# Patient Record
Sex: FEMALE | Race: WHITE | NOT HISPANIC OR LATINO | Employment: UNEMPLOYED | URBAN - METROPOLITAN AREA
[De-identification: names, ages, dates, MRNs, and addresses within clinical notes are randomized per-mention and may not be internally consistent; named-entity substitution may affect disease eponyms.]

---

## 2021-11-13 ENCOUNTER — APPOINTMENT (OUTPATIENT)
Dept: CT IMAGING | Facility: HOSPITAL | Age: 51
End: 2021-11-13

## 2021-11-13 ENCOUNTER — HOSPITAL ENCOUNTER (EMERGENCY)
Facility: HOSPITAL | Age: 51
Discharge: HOME | End: 2021-11-13
Attending: EMERGENCY MEDICINE | Admitting: EMERGENCY MEDICINE

## 2021-11-13 VITALS
BODY MASS INDEX: 30.13 KG/M2 | HEART RATE: 61 BPM | SYSTOLIC BLOOD PRESSURE: 98 MMHG | OXYGEN SATURATION: 98 % | WEIGHT: 149.47 LBS | RESPIRATION RATE: 16 BRPM | HEIGHT: 59 IN | DIASTOLIC BLOOD PRESSURE: 60 MMHG | TEMPERATURE: 97.7 F

## 2021-11-13 DIAGNOSIS — R10.9 FLANK PAIN: ICD-10-CM

## 2021-11-13 DIAGNOSIS — N12 PYELONEPHRITIS: ICD-10-CM

## 2021-11-13 LAB
ALBUMIN SERPL-MCNC: 4 G/DL (ref 3.5–5.2)
ALBUMIN/GLOB SERPL: 1.3 G/DL
ALP SERPL-CCNC: 115 U/L (ref 39–117)
ALT SERPL W P-5'-P-CCNC: 16 U/L (ref 1–33)
ANION GAP SERPL CALCULATED.3IONS-SCNC: 8.6 MMOL/L (ref 5–15)
AST SERPL-CCNC: 13 U/L (ref 1–32)
BACTERIA UR QL AUTO: ABNORMAL /HPF
BASOPHILS # BLD AUTO: 0.01 10*3/MM3 (ref 0–0.2)
BASOPHILS NFR BLD AUTO: 0.2 % (ref 0–1.5)
BILIRUB SERPL-MCNC: 0.4 MG/DL (ref 0–1.2)
BILIRUB UR QL STRIP: NEGATIVE
BUN SERPL-MCNC: 17 MG/DL (ref 6–20)
BUN/CREAT SERPL: 17.9 (ref 7–25)
CALCIUM SPEC-SCNC: 9 MG/DL (ref 8.6–10.5)
CHLORIDE SERPL-SCNC: 102 MMOL/L (ref 98–107)
CLARITY UR: ABNORMAL
CO2 SERPL-SCNC: 24.4 MMOL/L (ref 22–29)
COLOR UR: YELLOW
CREAT SERPL-MCNC: 0.95 MG/DL (ref 0.57–1)
DEPRECATED RDW RBC AUTO: 48.1 FL (ref 37–54)
EOSINOPHIL # BLD AUTO: 0.01 10*3/MM3 (ref 0–0.4)
EOSINOPHIL NFR BLD AUTO: 0.2 % (ref 0.3–6.2)
ERYTHROCYTE [DISTWIDTH] IN BLOOD BY AUTOMATED COUNT: 13.1 % (ref 12.3–15.4)
GFR SERPL CREATININE-BSD FRML MDRD: 62 ML/MIN/1.73
GLOBULIN UR ELPH-MCNC: 3.1 GM/DL
GLUCOSE SERPL-MCNC: 146 MG/DL (ref 65–99)
GLUCOSE UR STRIP-MCNC: NEGATIVE MG/DL
HCG SERPL QL: NEGATIVE
HCT VFR BLD AUTO: 36.2 % (ref 34–46.6)
HGB BLD-MCNC: 12.6 G/DL (ref 12–15.9)
HGB UR QL STRIP.AUTO: ABNORMAL
HOLD SPECIMEN: NORMAL
HOLD SPECIMEN: NORMAL
HYALINE CASTS UR QL AUTO: ABNORMAL /LPF
IMM GRANULOCYTES # BLD AUTO: 0.02 10*3/MM3 (ref 0–0.05)
IMM GRANULOCYTES NFR BLD AUTO: 0.3 % (ref 0–0.5)
KETONES UR QL STRIP: NEGATIVE
LEUKOCYTE ESTERASE UR QL STRIP.AUTO: ABNORMAL
LIPASE SERPL-CCNC: 32 U/L (ref 13–60)
LYMPHOCYTES # BLD AUTO: 1.61 10*3/MM3 (ref 0.7–3.1)
LYMPHOCYTES NFR BLD AUTO: 26.7 % (ref 19.6–45.3)
MCH RBC QN AUTO: 34.8 PG (ref 26.6–33)
MCHC RBC AUTO-ENTMCNC: 34.8 G/DL (ref 31.5–35.7)
MCV RBC AUTO: 100 FL (ref 79–97)
MONOCYTES # BLD AUTO: 0.54 10*3/MM3 (ref 0.1–0.9)
MONOCYTES NFR BLD AUTO: 8.9 % (ref 5–12)
NEUTROPHILS NFR BLD AUTO: 3.85 10*3/MM3 (ref 1.7–7)
NEUTROPHILS NFR BLD AUTO: 63.7 % (ref 42.7–76)
NITRITE UR QL STRIP: POSITIVE
NRBC BLD AUTO-RTO: 0 /100 WBC (ref 0–0.2)
PH UR STRIP.AUTO: 6 [PH] (ref 5–8)
PLATELET # BLD AUTO: 174 10*3/MM3 (ref 140–450)
PMV BLD AUTO: 9.9 FL (ref 6–12)
POTASSIUM SERPL-SCNC: 4.1 MMOL/L (ref 3.5–5.2)
PROT SERPL-MCNC: 7.1 G/DL (ref 6–8.5)
PROT UR QL STRIP: NEGATIVE
RBC # BLD AUTO: 3.62 10*6/MM3 (ref 3.77–5.28)
RBC # UR: ABNORMAL /HPF
REF LAB TEST METHOD: ABNORMAL
SODIUM SERPL-SCNC: 135 MMOL/L (ref 136–145)
SP GR UR STRIP: 1.02 (ref 1–1.03)
SQUAMOUS #/AREA URNS HPF: ABNORMAL /HPF
TROPONIN T SERPL-MCNC: <0.01 NG/ML (ref 0–0.03)
UROBILINOGEN UR QL STRIP: ABNORMAL
WBC # BLD AUTO: 6.04 10*3/MM3 (ref 3.4–10.8)
WBC UR QL AUTO: ABNORMAL /HPF
WHOLE BLOOD HOLD SPECIMEN: NORMAL
WHOLE BLOOD HOLD SPECIMEN: NORMAL

## 2021-11-13 PROCEDURE — 74176 CT ABD & PELVIS W/O CONTRAST: CPT

## 2021-11-13 PROCEDURE — 80053 COMPREHEN METABOLIC PANEL: CPT | Performed by: EMERGENCY MEDICINE

## 2021-11-13 PROCEDURE — 85025 COMPLETE CBC W/AUTO DIFF WBC: CPT | Performed by: EMERGENCY MEDICINE

## 2021-11-13 PROCEDURE — 96375 TX/PRO/DX INJ NEW DRUG ADDON: CPT

## 2021-11-13 PROCEDURE — 25010000002 KETOROLAC TROMETHAMINE PER 15 MG: Performed by: EMERGENCY MEDICINE

## 2021-11-13 PROCEDURE — 81001 URINALYSIS AUTO W/SCOPE: CPT | Performed by: EMERGENCY MEDICINE

## 2021-11-13 PROCEDURE — 96374 THER/PROPH/DIAG INJ IV PUSH: CPT

## 2021-11-13 PROCEDURE — 25010000002 HYDROMORPHONE 1 MG/ML SOLUTION: Performed by: EMERGENCY MEDICINE

## 2021-11-13 PROCEDURE — 99283 EMERGENCY DEPT VISIT LOW MDM: CPT

## 2021-11-13 PROCEDURE — 84703 CHORIONIC GONADOTROPIN ASSAY: CPT | Performed by: EMERGENCY MEDICINE

## 2021-11-13 PROCEDURE — 83690 ASSAY OF LIPASE: CPT | Performed by: EMERGENCY MEDICINE

## 2021-11-13 PROCEDURE — 36415 COLL VENOUS BLD VENIPUNCTURE: CPT

## 2021-11-13 PROCEDURE — 84484 ASSAY OF TROPONIN QUANT: CPT | Performed by: EMERGENCY MEDICINE

## 2021-11-13 PROCEDURE — 25010000002 ONDANSETRON PER 1 MG: Performed by: EMERGENCY MEDICINE

## 2021-11-13 RX ORDER — ONDANSETRON 4 MG/1
4 TABLET, ORALLY DISINTEGRATING ORAL EVERY 8 HOURS PRN
Qty: 10 TABLET | Refills: 0 | Status: SHIPPED | OUTPATIENT
Start: 2021-11-13

## 2021-11-13 RX ORDER — HYDROCODONE BITARTRATE AND ACETAMINOPHEN 5; 325 MG/1; MG/1
1 TABLET ORAL EVERY 6 HOURS PRN
Qty: 12 TABLET | Refills: 0 | Status: SHIPPED | OUTPATIENT
Start: 2021-11-13

## 2021-11-13 RX ORDER — ONDANSETRON 2 MG/ML
4 INJECTION INTRAMUSCULAR; INTRAVENOUS ONCE
Status: COMPLETED | OUTPATIENT
Start: 2021-11-13 | End: 2021-11-13

## 2021-11-13 RX ORDER — LEVOFLOXACIN 750 MG/1
750 TABLET ORAL DAILY
Qty: 5 TABLET | Refills: 0 | Status: SHIPPED | OUTPATIENT
Start: 2021-11-13

## 2021-11-13 RX ORDER — ATORVASTATIN CALCIUM 80 MG/1
80 TABLET, FILM COATED ORAL DAILY
COMMUNITY

## 2021-11-13 RX ORDER — SODIUM CHLORIDE 0.9 % (FLUSH) 0.9 %
10 SYRINGE (ML) INJECTION AS NEEDED
Status: DISCONTINUED | OUTPATIENT
Start: 2021-11-13 | End: 2021-11-13 | Stop reason: HOSPADM

## 2021-11-13 RX ORDER — KETOROLAC TROMETHAMINE 30 MG/ML
30 INJECTION, SOLUTION INTRAMUSCULAR; INTRAVENOUS ONCE
Status: COMPLETED | OUTPATIENT
Start: 2021-11-13 | End: 2021-11-13

## 2021-11-13 RX ORDER — LOSARTAN POTASSIUM 25 MG/1
25 TABLET ORAL DAILY
COMMUNITY

## 2021-11-13 RX ADMIN — ONDANSETRON 4 MG: 2 INJECTION INTRAMUSCULAR; INTRAVENOUS at 11:36

## 2021-11-13 RX ADMIN — HYDROMORPHONE HYDROCHLORIDE 0.5 MG: 1 INJECTION, SOLUTION INTRAMUSCULAR; INTRAVENOUS; SUBCUTANEOUS at 11:41

## 2021-11-13 RX ADMIN — KETOROLAC TROMETHAMINE 30 MG: 30 INJECTION, SOLUTION INTRAMUSCULAR; INTRAVENOUS at 11:39

## 2021-11-13 NOTE — ED PROVIDER NOTES
"Time: 11:13 AM EST  Arrived by: {arrived by:[id number]::\"private car\"}  Chief Complaint: ***  History provided by: ***  History is limited by: ***N/A     History of Present Illness:  Patient is a 51 y.o. year old female that presents to the emergency department with ***    HPI    Similar Symptoms Previously: ***  Recently seen: ***      Patient Care Team  Primary Care Provider: No primary care provider on file.    Past Medical History:     Allergies   Allergen Reactions   • Penicillins Other (See Comments)     swelling     Past Medical History:   Diagnosis Date   • Hypertension    • Kidney stone    • Myocardial infarction (HCC)      Past Surgical History:   Procedure Laterality Date   •  SECTION     • CORONARY ANGIOPLASTY WITH STENT PLACEMENT     • TUBAL ABDOMINAL LIGATION       History reviewed. No pertinent family history.    Home Medications:  Prior to Admission medications    Not on File        Social History:   Social History     Tobacco Use   • Smoking status: Heavy Tobacco Smoker     Packs/day: 0.50     Types: Cigarettes   • Smokeless tobacco: Not on file   Substance Use Topics   • Alcohol use: Never   • Drug use: Never     Recent travel: {yes/no:63}     Review of Systems:  Review of Systems     Physical Exam:  BP (!) 151/103 (BP Location: Left arm, Patient Position: Sitting)   Pulse 88   Temp 98.9 °F (37.2 °C) (Oral)   Resp 16   Ht 149.9 cm (59\")   Wt 67.8 kg (149 lb 7.6 oz)   SpO2 99%   BMI 30.19 kg/m²     Physical Exam           Medications in the Emergency Department:  Medications   sodium chloride 0.9 % flush 10 mL (has no administration in time range)        Labs  Lab Results (last 24 hours)     ** No results found for the last 24 hours. **           Imaging:  No Radiology Exams Resulted Within Past 24 Hours    Procedures:  Procedures    Progress                            Medical Decision Making:  [facility]     Final diagnoses:   None        Disposition:  ED Disposition     None    "       Documentation assistance provided by Sinan Moore DO acting as scribe for Sinan Moore DO. Information recorded by the scribe was done at my direction and has been verified and validated by me.

## 2021-11-13 NOTE — ED PROVIDER NOTES
Time: 11:19 AM EST  Arrived by: private car  Chief Complaint: Flank Pain  History provided by: Patient  History is limited by: N/A     History of Present Illness:  Patient is a 51 y.o. year old female that presents to the emergency department with severe 10/10 left flank pain that woke her up out of her sleep at 0230 this morning. She also complains of nausea and urinary hesitancy.    The patient moved here recently and does note a history of past kidney stones that she has passed on her own. Normally, her kidney stones are on the right side.      Flank Pain  Pain location:  L flank  Pain severity:  Severe  Duration: Since 0230 this morning.  Timing:  Intermittent  Associated symptoms: nausea    Associated symptoms: no chest pain, no chills, no diarrhea, no dysuria, no fever, no shortness of breath, no sore throat and no vomiting        Patient Care Team  Primary Care Provider: Provider, Nicolasa Known    Past Medical History:     Allergies   Allergen Reactions   • Penicillins Other (See Comments)     swelling     Past Medical History:   Diagnosis Date   • Hypertension    • Kidney stone    • Myocardial infarction (HCC)      Past Surgical History:   Procedure Laterality Date   •  SECTION     • CORONARY ANGIOPLASTY WITH STENT PLACEMENT     • TUBAL ABDOMINAL LIGATION       History reviewed. No pertinent family history.    Home Medications:  Prior to Admission medications    Medication Sig Start Date End Date Taking? Authorizing Provider   atorvastatin (LIPITOR) 80 MG tablet Take 80 mg by mouth Daily.   Yes Patricia Segura MD   losartan (COZAAR) 25 MG tablet Take 25 mg by mouth Daily.   Yes Patricia Segura MD   metoprolol tartrate (LOPRESSOR) 25 MG tablet Take 25 mg by mouth 2 (Two) Times a Day.   Yes Patricia Segura MD        Social History:   Social History     Tobacco Use   • Smoking status: Heavy Tobacco Smoker     Packs/day: 0.50     Types: Cigarettes   • Smokeless tobacco: Not on file  "  Substance Use Topics   • Alcohol use: Never   • Drug use: Never     Recent travel: not applicable     Review of Systems:  Review of Systems   Constitutional: Negative for chills and fever.   HENT: Negative for sore throat.    Eyes: Negative for photophobia.   Respiratory: Negative for shortness of breath.    Cardiovascular: Negative for chest pain.   Gastrointestinal: Positive for nausea. Negative for abdominal pain, diarrhea and vomiting.   Genitourinary: Positive for flank pain (left). Negative for dysuria.        Urinary hesitancy   Musculoskeletal: Negative for neck pain.   Skin: Negative for wound.   Neurological: Negative for headaches.   All other systems reviewed and are negative.       Physical Exam:  BP 98/60 (BP Location: Right arm, Patient Position: Lying)   Pulse 61   Temp 97.7 °F (36.5 °C) (Oral)   Resp 16   Ht 149.9 cm (59\")   Wt 67.8 kg (149 lb 7.6 oz)   SpO2 98%   BMI 30.19 kg/m²     Physical Exam  Vitals and nursing note reviewed.   Constitutional:       General: She is not in acute distress.  HENT:      Head: Normocephalic and atraumatic.   Eyes:      Extraocular Movements: Extraocular movements intact.   Cardiovascular:      Rate and Rhythm: Normal rate and regular rhythm.   Pulmonary:      Effort: Pulmonary effort is normal. No respiratory distress.      Breath sounds: Normal breath sounds.   Abdominal:      General: Abdomen is flat.      Palpations: Abdomen is soft.      Tenderness: There is no abdominal tenderness. There is left CVA tenderness.   Musculoskeletal:         General: Normal range of motion.      Cervical back: Normal range of motion and neck supple.   Skin:     General: Skin is warm and dry.      Capillary Refill: Capillary refill takes less than 2 seconds.   Neurological:      Mental Status: She is alert and oriented to person, place, and time. Mental status is at baseline.                Medications in the Emergency Department:  Medications   sodium chloride 0.9 % flush " 10 mL (has no administration in time range)   ketorolac (TORADOL) injection 30 mg (30 mg Intravenous Given 11/13/21 1139)   ondansetron (ZOFRAN) injection 4 mg (4 mg Intravenous Given 11/13/21 1136)   HYDROmorphone (DILAUDID) injection 0.5 mg (0.5 mg Intravenous Given 11/13/21 1141)        Labs  Lab Results (last 24 hours)     Procedure Component Value Units Date/Time    CBC & Differential [[id number]]  (Abnormal) Collected: 11/13/21 1100    Specimen: Blood from Arm, Right Updated: 11/13/21 1118    Narrative:      The following orders were created for panel order CBC & Differential.  Procedure                               Abnormality         Status                     ---------                               -----------         ------                     CBC Auto Differential[[id number]]        Abnormal            Final result                 Please view results for these tests on the individual orders.    Comprehensive Metabolic Panel [[id number]]  (Abnormal) Collected: 11/13/21 1100    Specimen: Blood from Arm, Right Updated: 11/13/21 1136     Glucose 146 mg/dL      BUN 17 mg/dL      Creatinine 0.95 mg/dL      Sodium 135 mmol/L      Potassium 4.1 mmol/L      Chloride 102 mmol/L      CO2 24.4 mmol/L      Calcium 9.0 mg/dL      Total Protein 7.1 g/dL      Albumin 4.00 g/dL      ALT (SGPT) 16 U/L      AST (SGOT) 13 U/L      Alkaline Phosphatase 115 U/L      Total Bilirubin 0.4 mg/dL      eGFR Non African Amer 62 mL/min/1.73      Globulin 3.1 gm/dL      A/G Ratio 1.3 g/dL      BUN/Creatinine Ratio 17.9     Anion Gap 8.6 mmol/L     Narrative:      GFR Normal >60  Chronic Kidney Disease <60  Kidney Failure <15      Lipase [[id number]]  (Normal) Collected: 11/13/21 1100    Specimen: Blood from Arm, Right Updated: 11/13/21 1136     Lipase 32 U/L     Troponin [[id number]]  (Normal) Collected: 11/13/21 1100    Specimen: Blood from Arm, Right Updated: 11/13/21 1136     Troponin T <0.010 ng/mL     Narrative:      Troponin T  Reference Range:  <= 0.03 ng/mL-   Negative for AMI  >0.03 ng/mL-     Abnormal for myocardial necrosis.  Clinicians would have to utilize clinical acumen, EKG, Troponin and serial changes to determine if it is an Acute Myocardial Infarction or myocardial injury due to an underlying chronic condition.       Results may be falsely decreased if patient taking Biotin.      hCG, Serum, Qualitative [[id number]]  (Normal) Collected: 11/13/21 1100    Specimen: Blood from Arm, Right Updated: 11/13/21 1136     HCG Qualitative Negative    Narrative:      Sensitive immunoassays may demonstrate false positive results  with specimens containing heterophilic antibodies. Assays may  also exhibit false-positive or false-negative results with  specimens containing human anti-mouse antibodies. These   specimens may come from patients receving preparations of  mouse monoclonal antibodies for diagnosis or therapy or having  been exposed to mice. If the qualitative interpretation is  inconsistent with the clinical evaluation, results should be   confirmed by an alternate hCG method, ie. quantitative hCG.    CBC Auto Differential [[id number]]  (Abnormal) Collected: 11/13/21 1100    Specimen: Blood from Arm, Right Updated: 11/13/21 1118     WBC 6.04 10*3/mm3      RBC 3.62 10*6/mm3      Hemoglobin 12.6 g/dL      Hematocrit 36.2 %      .0 fL      MCH 34.8 pg      MCHC 34.8 g/dL      RDW 13.1 %      RDW-SD 48.1 fl      MPV 9.9 fL      Platelets 174 10*3/mm3      Neutrophil % 63.7 %      Lymphocyte % 26.7 %      Monocyte % 8.9 %      Eosinophil % 0.2 %      Basophil % 0.2 %      Immature Grans % 0.3 %      Neutrophils, Absolute 3.85 10*3/mm3      Lymphocytes, Absolute 1.61 10*3/mm3      Monocytes, Absolute 0.54 10*3/mm3      Eosinophils, Absolute 0.01 10*3/mm3      Basophils, Absolute 0.01 10*3/mm3      Immature Grans, Absolute 0.02 10*3/mm3      nRBC 0.0 /100 WBC     Urinalysis With Microscopic If Indicated (No Culture) - Urine, Clean  Catch [[id number]]  (Abnormal) Collected: 11/13/21 1135    Specimen: Urine, Clean Catch Updated: 11/13/21 1205     Color, UA Yellow     Appearance, UA Cloudy     pH, UA 6.0     Specific Gravity, UA 1.017     Glucose, UA Negative     Ketones, UA Negative     Bilirubin, UA Negative     Blood, UA Small (1+)     Protein, UA Negative     Leuk Esterase, UA Trace     Nitrite, UA Positive     Urobilinogen, UA 0.2 E.U./dL    Urinalysis, Microscopic Only - Urine, Clean Catch [[id number]]  (Abnormal) Collected: 11/13/21 1135    Specimen: Urine, Clean Catch Updated: 11/13/21 1206     RBC, UA 3-5 /HPF      WBC, UA 6-12 /HPF      Bacteria, UA 4+ /HPF      Squamous Epithelial Cells, UA 0-2 /HPF      Hyaline Casts, UA None Seen /LPF      Methodology Automated Microscopy           Imaging:  CT Abdomen Pelvis Without Contrast    Result Date: 11/13/2021  PROCEDURE: CT ABDOMEN PELVIS WO CONTRAST  COMPARISON: None  INDICATIONS: flank pain  TECHNIQUE: CT images were created without intravenous contrast.   PROTOCOL:   Standard imaging protocol performed    RADIATION:   DLP: 420 mGy*cm   Automated exposure control was utilized to minimize radiation dose.  FINDINGS:  Lung bases are clear. The distal esophagus is unremarkable. Heart size is normal. The superficial fat and the underlying musculature appear within normal limits. There are no acute osseous abnormalities or destructive bone lesions.  There is mild lower lumbar facet arthropathy.   There is a punctate nonobstructing stone at the superior left kidney.  There is a 3 mm nonobstructing mid left renal pelvis stone.  Punctate nonobstructing inferior right kidney stone.  No definite ureteral stone.  There is mild asymmetric stranding in the left renal sinus fat.  The liver, stomach, gallbladder, biliary tree, and spleen, pancreas, adrenal glands, ureters, urinary bladder and loops of small and large bowel appear within normal limits. The appendix is clearly demonstrated and appears  normal. There is no ascites, pneumoperitoneum or lymphadenopathy. Abdominal vasculature is unremarkable.   CONCLUSION:  1. Small nonobstructing bilateral kidney stones.  There is a kidney stone in the left renal pelvis and there is slight left renal sinus fat stranding, which may indicate inflammation.  This could be from UTI or recently passed stone or intermittently obstructing stone. 2. Mild lower lumbar degenerative changes.     MISTY DONATO MD       Electronically Signed and Approved By: MISTY DONATO MD on 11/13/2021 at 12:14               Procedures:  Procedures    Progress  ED Course as of 11/13/21 1342   Sat Nov 13, 2021   1254 Dr. Moore spoke with the patient and spouse regarding her diagnostic test results and plan for discharge.  [LG]      ED Course User Index  [LG] Dagmar Hernandez                            Medical Decision Making:  MDM   51-year-old female patient presented with left flank pain that started about 230 this morning.  Patient has history of kidney stones and last passed a stone 3 months ago.  Patient is new to the area does not have any primary care locally.  Patient's pain and nausea were controlled with medications.  CT evaluation shows bilateral kidney stones but no current stone in the ureter.  There is evidence for perinephric stranding on the left side suggesting either infection or recently passed stone.  The patient's urine definitely shows acute urinary tract infection.  I suspect the patient has early pyelonephritis.  She may also have passed a stone simultaneously.  Patient is stable for discharge on antibiotics, nausea medication and pain medication.        Final diagnoses:   Pyelonephritis   Flank pain        Disposition:  ED Disposition     ED Disposition Condition Comment    Discharge Stable           Documentation assistance provided by Dagmar Hernandez acting as scribe for Sinan Moore DO. Information recorded by the scribe was done at my direction and has been  verified and validated by me.        Dagmar Hernandez  11/13/21 1125       Sinan Moore DO  11/13/21 2092

## 2022-07-02 RX ORDER — ATORVASTATIN CALCIUM 40 MG/1
TABLET, FILM COATED ORAL
COMMUNITY

## 2022-07-02 RX ORDER — LOSARTAN POTASSIUM 25 MG/1
TABLET ORAL
COMMUNITY